# Patient Record
Sex: FEMALE | Race: BLACK OR AFRICAN AMERICAN | URBAN - METROPOLITAN AREA
[De-identification: names, ages, dates, MRNs, and addresses within clinical notes are randomized per-mention and may not be internally consistent; named-entity substitution may affect disease eponyms.]

---
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
## 2022-02-17 ENCOUNTER — OFFICE VISIT (OUTPATIENT)
Dept: URBAN - METROPOLITAN AREA CLINIC 118 | Facility: CLINIC | Age: 22
End: 2022-02-17
Payer: COMMERCIAL

## 2022-02-17 VITALS
HEART RATE: 86 BPM | TEMPERATURE: 97.7 F | SYSTOLIC BLOOD PRESSURE: 104 MMHG | HEIGHT: 67 IN | WEIGHT: 120.4 LBS | BODY MASS INDEX: 18.9 KG/M2 | DIASTOLIC BLOOD PRESSURE: 65 MMHG

## 2022-02-17 DIAGNOSIS — R63.0 POOR APPETITE: ICD-10-CM

## 2022-02-17 DIAGNOSIS — R55 SYNCOPE, UNSPECIFIED SYNCOPE TYPE: ICD-10-CM

## 2022-02-17 PROBLEM — 64379006: Status: ACTIVE | Noted: 2021-08-18

## 2022-02-17 PROCEDURE — 99213 OFFICE O/P EST LOW 20 MIN: CPT | Performed by: PEDIATRICS

## 2022-02-17 RX ORDER — CYPROHEPTADINE HYDROCHLORIDE 4 MG/1
2 TABLET TABLET ORAL QHS
Qty: 180 TABLET | Refills: 11 | OUTPATIENT

## 2022-02-17 RX ORDER — CYPROHEPTADINE HYDROCHLORIDE 4 MG/1
2 TABLET TABLET ORAL QHS
Qty: 60 | Refills: 3 | Status: ACTIVE | COMMUNITY
Start: 2021-08-18

## 2022-02-17 NOTE — HPI-TODAY'S VISIT:
2/17/22 EST pt . Doing well. Taking cyproheptadine 8mg qhs, +appetite decreases without medication Seen by neurology - pt states dr recommended pt eating more.  No abdominal pain, vomiting, dysphagia, SOB, etc Moved to [place] from [place], planning on going to [facility] with family for leisure trip BMS 3-4x/week, soft BSS4 .

## 2022-02-17 NOTE — HPI-OTHER HISTORIES PEDS
8/18/21 NEW PT Referral from Dr. Sweet  Fainting episode 1 week ago with convulsion, pt was moving into college living space in [place], did not eat or drink water that day, weather was around 90deg F. Pt has had similar episode to that a few years ago.  Last 24 hour food diary: 2 waffles, 1 fruit cup, 1 pickle, chicken (fried x 1 pc), egg roll x2-3, corn. Pt denies any negative body image thoughts  BMs: 2-3x/week, sometimes hard. No straining, no diarrhea, no hematochezia, no nocturnal stooling  No abdominal pain, no dysphagia. No nausea/no vomiting.  Mother is small framed, she has 9 children. Pt's sister has seizures.  -- Celiac panel wnl.  --

## 2023-11-29 ENCOUNTER — WEB ENCOUNTER (OUTPATIENT)
Dept: URBAN - METROPOLITAN AREA CLINIC 90 | Facility: CLINIC | Age: 23
End: 2023-11-29

## 2023-12-03 ENCOUNTER — WEB ENCOUNTER (OUTPATIENT)
Dept: URBAN - METROPOLITAN AREA CLINIC 118 | Facility: CLINIC | Age: 23
End: 2023-12-03

## 2023-12-13 ENCOUNTER — CLAIMS CREATED FROM THE CLAIM WINDOW (OUTPATIENT)
Dept: URBAN - METROPOLITAN AREA TELEHEALTH 2 | Facility: TELEHEALTH | Age: 23
End: 2023-12-13
Payer: COMMERCIAL

## 2023-12-13 ENCOUNTER — DASHBOARD ENCOUNTERS (OUTPATIENT)
Age: 23
End: 2023-12-13

## 2023-12-13 VITALS — WEIGHT: 120.4 LBS | HEIGHT: 67 IN | BODY MASS INDEX: 18.9 KG/M2

## 2023-12-13 DIAGNOSIS — R63.0 POOR APPETITE: ICD-10-CM

## 2023-12-13 DIAGNOSIS — R63.4 WEIGHT LOSS: ICD-10-CM

## 2023-12-13 PROCEDURE — 99214 OFFICE O/P EST MOD 30 MIN: CPT | Performed by: INTERNAL MEDICINE

## 2023-12-13 RX ORDER — CYPROHEPTADINE HYDROCHLORIDE 4 MG/1
2 TABLET TABLET ORAL TWICE A DAY
Qty: 360 TABLET | Refills: 5

## 2023-12-13 RX ORDER — CYPROHEPTADINE HYDROCHLORIDE 4 MG/1
2 TABLET TABLET ORAL QHS
Qty: 180 TABLET | Refills: 11 | Status: ACTIVE | COMMUNITY